# Patient Record
Sex: FEMALE | Race: WHITE | Employment: STUDENT | URBAN - METROPOLITAN AREA
[De-identification: names, ages, dates, MRNs, and addresses within clinical notes are randomized per-mention and may not be internally consistent; named-entity substitution may affect disease eponyms.]

---

## 2024-01-04 ENCOUNTER — OFFICE VISIT (OUTPATIENT)
Dept: CARDIOLOGY | Facility: HOSPITAL | Age: 24
End: 2024-01-04
Payer: COMMERCIAL

## 2024-01-04 ENCOUNTER — LAB (OUTPATIENT)
Dept: LAB | Facility: LAB | Age: 24
End: 2024-01-04
Payer: COMMERCIAL

## 2024-01-04 VITALS
HEART RATE: 72 BPM | SYSTOLIC BLOOD PRESSURE: 112 MMHG | BODY MASS INDEX: 20.89 KG/M2 | WEIGHT: 130 LBS | DIASTOLIC BLOOD PRESSURE: 72 MMHG | HEIGHT: 66 IN | OXYGEN SATURATION: 100 %

## 2024-01-04 DIAGNOSIS — Z83.2 FAMILY HISTORY OF FACTOR V LEIDEN MUTATION: ICD-10-CM

## 2024-01-04 DIAGNOSIS — Z82.49 FAMILY HISTORY OF BLOOD CLOTS: ICD-10-CM

## 2024-01-04 PROCEDURE — 85307 ASSAY ACTIVATED PROTEIN C: CPT

## 2024-01-04 PROCEDURE — 99203 OFFICE O/P NEW LOW 30 MIN: CPT | Performed by: INTERNAL MEDICINE

## 2024-01-04 PROCEDURE — 36415 COLL VENOUS BLD VENIPUNCTURE: CPT

## 2024-01-04 PROCEDURE — 99213 OFFICE O/P EST LOW 20 MIN: CPT | Performed by: INTERNAL MEDICINE

## 2024-01-04 PROCEDURE — 1036F TOBACCO NON-USER: CPT | Performed by: INTERNAL MEDICINE

## 2024-01-04 RX ORDER — ALBUTEROL SULFATE 90 UG/1
2 AEROSOL, METERED RESPIRATORY (INHALATION) EVERY 6 HOURS PRN
COMMUNITY
Start: 2017-06-26

## 2024-01-04 ASSESSMENT — PAIN SCALES - GENERAL: PAINLEVEL: 0-NO PAIN

## 2024-01-04 NOTE — PROGRESS NOTES
"01/04/24  3:53 PM    Vascular Medicine    This terrific 23 y.o. woman is seen for an opinion regarding family hx of VTE.    She has no hx of thrombosis and is healthy aside from exercise related asthma, vasovagal syncope.    She has had ACL surgery without VTE.    Her mother had DVT in setting of lower extremity trauma/fracture/cast (and surgery after DVT dx).  Her mom is a HZ for Factor V Leiden.  Her maternal grandfather had provoked PE, no known hypercoag testing done.    Ms. Richard is here to review her thrombotic risk.    She feels well. No limb swelling. Was on OCPs for 1 month years ago with acne.    Past Medical History:   Diagnosis Date    Adult hypertrophic pyloric stenosis     Gastric out let obstruction    Exercise induced bronchospasm     Exercise-induced asthma    Other specified health status     No pertinent past medical history     Current Outpatient Medications   Medication Sig Dispense Refill    albuterol 90 mcg/actuation inhaler Inhale 2 puffs every 6 hours if needed.       No current facility-administered medications for this visit.     Family History   Problem Relation Name Age of Onset    Factor V Leiden deficiency Mother      Blood clot Mother      Blood clot Maternal Grandfather      Coronary artery disease Paternal Grandfather      Other (HTN) Paternal Grandfather       She is a law student    On examination:  /72 (BP Location: Right arm, Patient Position: Sitting, BP Cuff Size: Small adult)   Pulse 72   Ht 1.676 m (5' 6\")   Wt 59 kg (130 lb)   SpO2 100%   BMI 20.98 kg/m²   HEENT benign  JVP flat  +S1, S2, RRR; no m/r/g  Clear lungs  Abd soft, benign, no bruits  Brisk LE pulses  No edema, no varicose veins  She was alert and oriented, fluid speech, appropriate affect    23 y.o. woman with family hx of VTE in mom and maternal grandfather and family hx (mom HZ) of Factor V Leiden    She has no personal hx of thrombosis, but we will check APC resistance as her having abnormality " would have implications for choice of birth control (would favor IUD or progesterone only mini pill if has APC resistance) and any prophylaxis for future surgeries, etc.    After a risk/benefit discussion, we decided to proceed with screening for APC resistance, and I ordered the test.    I will follow up with Ms. Richard on the results.    Sandra Fenton MD

## 2024-01-04 NOTE — PATIENT INSTRUCTIONS
Ordered blood work - we will reach out with results.    Very truly yours,    Sandra Fenton MD  Co-Director, Vascular Center  [facility], [facility]   [facility] Master Clinician in Fibromuscular Dysplasia and Vascular Care  Professor of Medicine  [facility]

## 2024-01-07 LAB — APCR PPP: 5.17 {RATIO}
